# Patient Record
Sex: MALE | Race: BLACK OR AFRICAN AMERICAN | NOT HISPANIC OR LATINO | Employment: FULL TIME | URBAN - METROPOLITAN AREA
[De-identification: names, ages, dates, MRNs, and addresses within clinical notes are randomized per-mention and may not be internally consistent; named-entity substitution may affect disease eponyms.]

---

## 2021-04-11 ENCOUNTER — HOSPITAL ENCOUNTER (EMERGENCY)
Facility: HOSPITAL | Age: 51
Discharge: HOME | End: 2021-04-11
Attending: EMERGENCY MEDICINE | Admitting: EMERGENCY MEDICINE

## 2021-04-11 ENCOUNTER — APPOINTMENT (EMERGENCY)
Dept: RADIOLOGY | Facility: HOSPITAL | Age: 51
End: 2021-04-11

## 2021-04-11 VITALS
DIASTOLIC BLOOD PRESSURE: 70 MMHG | RESPIRATION RATE: 18 BRPM | SYSTOLIC BLOOD PRESSURE: 118 MMHG | OXYGEN SATURATION: 98 % | HEART RATE: 92 BPM
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
## 2021-04-11 DIAGNOSIS — M79.642 LEFT HAND PAIN: ICD-10-CM

## 2021-04-11 DIAGNOSIS — S06.0X9A CONCUSSION: Primary | ICD-10-CM

## 2021-04-11 DIAGNOSIS — M54.2 NECK PAIN: ICD-10-CM

## 2021-04-11 PROCEDURE — 99284 EMERGENCY DEPT VISIT MOD MDM: CPT | Performed by: EMERGENCY MEDICINE

## 2021-04-11 PROCEDURE — 99284 EMERGENCY DEPT VISIT MOD MDM: CPT

## 2021-04-11 PROCEDURE — 72125 CT NECK SPINE W/O DYE: CPT

## 2021-04-11 PROCEDURE — 73130 X-RAY EXAM OF HAND: CPT

## 2021-04-11 PROCEDURE — 70450 CT HEAD/BRAIN W/O DYE: CPT

## 2021-04-11 RX ORDER — ACETAMINOPHEN 325 MG/1
650 TABLET ORAL ONCE
Status: DISCONTINUED | OUTPATIENT
Start: 2021-04-11 | End: 2021-04-11

## 2021-04-11 RX ORDER — IBUPROFEN 400 MG/1
400 TABLET ORAL ONCE
Status: COMPLETED | OUTPATIENT
Start: 2021-04-11 | End: 2021-04-11

## 2021-04-11 RX ADMIN — IBUPROFEN 400 MG: 400 TABLET ORAL at 05:09

## 2021-04-11 NOTE — DISCHARGE INSTRUCTIONS
You were seen in the ED after a fall out of a chair  Your imaging was normal  You most likely have a concussion  Please follow up with physical therapy  You may use tylenol and ibuprofen as needed for pain  Return to the ED if you experience any worsening headaches, vision changes, loss of consciousness, or any other new or worsening symptoms

## 2021-04-11 NOTE — ED ATTENDING ATTESTATION
4/11/2021  ICharmayne Honey Pester, DO, saw and evaluated the patient  I have discussed the patient with the resident/non-physician practitioner and agree with the resident's/non-physician practitioner's findings, Plan of Care, and MDM as documented in the resident's/non-physician practitioner's note, except where noted  All available labs and Radiology studies were reviewed  I was present for key portions of any procedure(s) performed by the resident/non-physician practitioner and I was immediately available to provide assistance  At this point I agree with the current assessment done in the Emergency Department  I have conducted an independent evaluation of this patient a history and physical is as follows:    52 yo male presents for evaluation after fall from seated position while at the [facility]  States he fell backward in his chair, striking his head  +LOC  Doesn't remember what happened  C/o posterior HA and neck pain mostly C3, C4  Additionally c/o L hand pain  Denies focal weakness/numbness/tingling, vomiting, change in vision  No other c/o at this time  Occurred just PTA  Symptoms moderate severity, no a/e factors  Imp: fall from seated position  Closed head injury w/LOC  Neck pain w/Cspine TTP  Plan: CT head, Cspine, L hand films  Reassess        ED Course         Critical Care Time  Procedures

## 2021-04-11 NOTE — ED PROVIDER NOTES
History  Chief Complaint   Patient presents with   Maria Teresa Courser Fall     pt was at [facility] when he fell from chair and hit head; + LOC for a few seconds     Patient is a 61-year-old male, with no significant past medical history, who presents to the emergency department after falling out of a chair and striking his head  The fall occurred about 30 minutes prior to arrival   Patient states he was attempting to sit down on a chair at the [facility], when the chair came out from under him  He thinks he subsequently fell backwards, but does not remember exactly what happened or striking his head as he lost consciousness  He thinks he was maybe out for several seconds  Upon awakening, patient described pain to his head and the back of his neck  EMS was called, patient was placed in C-collar, and he was brought to the emergency department for further evaluation  In the ED, patient continues to endorse a headache and neck pain  He also states he has some left hand pain  There are no modifying factors  There are no associated symptoms  Patient denies any other injuries  He states he had 1 glass of wine earlier in the evening, but did not drink that much  Patient denies being on any blood thinners  Patient denies any chest pain, shortness of breath, abdominal pain, nausea, vomiting, diarrhea, vision change, or any other new or worsening symptoms  None       History reviewed  No pertinent past medical history  History reviewed  No pertinent surgical history  History reviewed  No pertinent family history  I have reviewed and agree with the history as documented  E-Cigarette/Vaping     E-Cigarette/Vaping Substances     Social History     Tobacco Use    Smoking status: Never Smoker    Smokeless tobacco: Never Used   Substance Use Topics    Alcohol use: Yes    Drug use: Never        Review of Systems   Constitutional: Negative for chills, fever and unexpected weight change     HENT: Negative for sore throat, trouble swallowing and voice change  Eyes: Negative for photophobia, redness and visual disturbance  Respiratory: Negative for cough and shortness of breath  Cardiovascular: Negative for chest pain and leg swelling  Gastrointestinal: Negative for abdominal pain, diarrhea, nausea and vomiting  Genitourinary: Negative for difficulty urinating, dysuria and hematuria  Musculoskeletal: Positive for neck pain  Negative for back pain and neck stiffness  Skin: Negative for rash and wound  Neurological: Positive for headaches  Negative for dizziness  Loss of consciousness   Psychiatric/Behavioral: Negative for agitation and confusion  All other systems reviewed and are negative  Physical Exam  ED Triage Vitals [04/11/21 0333]   Temp Pulse Respirations Blood Pressure SpO2   -- 92 18 118/70 98 %      Temp src Heart Rate Source Patient Position - Orthostatic VS BP Location FiO2 (%)   -- -- Lying Right arm --      Pain Score       8             Orthostatic Vital Signs  Vitals:    04/11/21 0333   BP: 118/70   Pulse: 92   Patient Position - Orthostatic VS: Lying       Physical Exam  Vitals signs and nursing note reviewed  Constitutional:       General: He is not in acute distress  Appearance: He is well-developed  He is not diaphoretic  Comments: In C-collar   HENT:      Head: Normocephalic and atraumatic  No raccoon eyes, Rahman's sign, abrasion, contusion, masses, right periorbital erythema, left periorbital erythema or laceration  Right Ear: Tympanic membrane, ear canal and external ear normal       Left Ear: Tympanic membrane, ear canal and external ear normal       Nose: Nose normal       Mouth/Throat:      Mouth: Mucous membranes are moist    Eyes:      General: Lids are normal  No scleral icterus  Right eye: No discharge  Left eye: No discharge  Extraocular Movements: Extraocular movements intact  Pupils: Pupils are equal, round, and reactive to light  Neck:      Musculoskeletal: Normal range of motion and neck supple  Cardiovascular:      Rate and Rhythm: Normal rate and regular rhythm  Heart sounds: Normal heart sounds  No murmur  No friction rub  No gallop  Pulmonary:      Effort: Pulmonary effort is normal  No respiratory distress  Breath sounds: Normal breath sounds  No wheezing or rales  Abdominal:      Palpations: Abdomen is soft  Tenderness: There is no abdominal tenderness  There is no guarding or rebound  Musculoskeletal: Normal range of motion  General: No deformity  Cervical back: He exhibits bony tenderness  He exhibits no deformity and no laceration  Left hand: He exhibits bony tenderness  Comments: Midline tenderness over the cervical spine  No obvious deformities or step-offs  There is also mild tenderness to palpation over the dorsum of the left metacarpals  No obvious deformities  Left upper extremity is neurovascularly intact  Skin:     General: Skin is warm and dry  Capillary Refill: Capillary refill takes less than 2 seconds  Neurological:      General: No focal deficit present  Mental Status: He is alert and oriented to person, place, and time  Motor: No weakness  Psychiatric:         Mood and Affect: Mood normal          Behavior: Behavior normal          ED Medications  Medications   ibuprofen (MOTRIN) tablet 400 mg (400 mg Oral Given 4/11/21 8493)       Diagnostic Studies  Results Reviewed     None                 XR hand 3+ views LEFT   ED Interpretation by Devon Patel DO (04/11 8955)   Irregular cortices of all phalanges  Unclear etiology  Possible small avulsion injury to the distal portion of the middle phalanx of the middle finger  CT head without contrast   Final Result by Jose Sotelo MD (04/11 0931)      No acute intracranial abnormality                    Workstation performed: [id number]         CT spine cervical without contrast   Final Result by Jose Frances MD (04/11 0407)      No cervical spine fracture or traumatic malalignment  Workstation performed: [id number]               Procedures  Procedures      ED Course  ED Course as of Apr 11 0540   Sun Apr 11, 2021 0419 XR hand 3+ views LEFT   2417 CT spine cervical without contrast   0419 CT head without contrast                                       MDM  Number of Diagnoses or Management Options  Concussion: minor  Fall, initial encounter: minor  Left hand pain: minor  Neck pain: minor  Diagnosis management comments: Patient is a 53 y o  male who presented to the ED for a fall  In the ED, patient was not tachycardic, normotensive and not tachypneic  On exam, patient was in a C-collar  He had midline cervical tenderness to palpation without step-offs or deformities  He also had tenderness to palpation over the dorsum of the left hand  There are no obvious deformities  His left upper extremity was neurovascularly intact  He was alert and oriented  He was answering all questions appropriately  Plan:  CT head, CT C-spine, plain films of the left hand  Motrin for pain  Findings:  No acute findings on imaging    Reassessment:  Patient was re-evaluated  Resting comfortably  Discussed results and findings with patient  Explained that he most likely sustained a concussion  Recommended patient follow-up with physical therapy  Patient verbalized understanding and agreed to plan of care           Amount and/or Complexity of Data Reviewed  Tests in the radiology section of CPT®: ordered and reviewed  Independent visualization of images, tracings, or specimens: yes    Risk of Complications, Morbidity, and/or Mortality  Presenting problems: high  Diagnostic procedures: high  Management options: moderate    Patient Progress  Patient progress: stable      Disposition  Final diagnoses:   Concussion   Fall, initial encounter   Left hand pain   Neck pain     Time reflects when diagnosis was documented in both MDM as applicable and the Disposition within this note     Time User Action Codes Description Comment    4/11/2021  4:15 AM Jasmyne Corporal Add [S06 0X9A] Concussion     4/11/2021  4:15 AM Jasmyne Corporal Add [F03  MWEP] Fall, initial encounter     4/11/2021  4:15 AM Jasmyne Corporal Add [O69 031] Left hand pain     4/11/2021  4:16 AM Jasmyne Corporal Add [M54 2] Neck pain       ED Disposition     ED Disposition Condition Date/Time Comment    Discharge Stable Sun Apr 11, 2021  4:15 AM Tala Mike discharge to home/self care  Follow-up Information     Follow up With Specialties Details Why Contact Info Additional Information    [address] [place] [address] Emergency Department Emergency Medicine  As needed [address]  [address] [facility] 64 [facility] Emergency Department, [address], [facility]   [phone]    Physical Therapy at [address] [place] [address] Physical Therapy Schedule an appointment as soon as possible for a visit   Sameer Larios   [phone] Physical Therapy at [address] [place] [address], [address]          There are no discharge medications for this patient  No discharge procedures on file  PDMP Review     None           ED Provider  Attending physically available and evaluated Tala Mike I managed the patient along with the ED Attending      Electronically Signed by         Pola Garcia DO  04/11/21 0540

## 2022-08-16 ENCOUNTER — NEW REFERRAL (OUTPATIENT)
Dept: URBAN - METROPOLITAN AREA CLINIC 109 | Facility: CLINIC | Age: 52
End: 2022-08-16

## 2022-08-16 DIAGNOSIS — Z79.4: ICD-10-CM

## 2022-08-16 DIAGNOSIS — E11.3412: ICD-10-CM

## 2022-08-16 DIAGNOSIS — H35.033: ICD-10-CM

## 2022-08-16 DIAGNOSIS — H43.11: ICD-10-CM

## 2022-08-16 DIAGNOSIS — E11.3511: ICD-10-CM

## 2022-08-16 PROCEDURE — 92134 CPTRZ OPH DX IMG PST SGM RTA: CPT

## 2022-08-16 PROCEDURE — 92202 OPSCPY EXTND ON/MAC DRAW: CPT

## 2022-08-16 PROCEDURE — 99204 OFFICE O/P NEW MOD 45 MIN: CPT

## 2022-08-16 ASSESSMENT — TONOMETRY
OS_IOP_MMHG: 21
OD_IOP_MMHG: 17
OS_IOP_MMHG: 23

## 2022-08-16 ASSESSMENT — VISUAL ACUITY
OS_SC: 20/100
OD_SC: 20/60

## 2022-08-25 ENCOUNTER — STUDY (OUTPATIENT)
Dept: URBAN - METROPOLITAN AREA CLINIC 19 | Facility: CLINIC | Age: 52
End: 2022-08-25

## 2022-08-25 ASSESSMENT — VISUAL ACUITY
OD_CC: 20/40
OS_CC: 20/100

## 2022-08-25 ASSESSMENT — TONOMETRY
OS_IOP_MMHG: 16
OD_IOP_MMHG: 15